# Patient Record
Sex: FEMALE | Race: BLACK OR AFRICAN AMERICAN | Employment: FULL TIME | URBAN - METROPOLITAN AREA
[De-identification: names, ages, dates, MRNs, and addresses within clinical notes are randomized per-mention and may not be internally consistent; named-entity substitution may affect disease eponyms.]

---

## 2023-10-14 ENCOUNTER — APPOINTMENT (OUTPATIENT)
Dept: GENERAL RADIOLOGY | Age: 58
End: 2023-10-14
Payer: COMMERCIAL

## 2023-10-14 ENCOUNTER — HOSPITAL ENCOUNTER (EMERGENCY)
Age: 58
Discharge: HOME | End: 2023-10-14
Payer: COMMERCIAL

## 2023-10-14 VITALS
DIASTOLIC BLOOD PRESSURE: 73 MMHG | HEIGHT: 65 IN | SYSTOLIC BLOOD PRESSURE: 128 MMHG | OXYGEN SATURATION: 99 % | HEART RATE: 86 BPM | BODY MASS INDEX: 41.65 KG/M2 | RESPIRATION RATE: 18 BRPM | WEIGHT: 250 LBS | TEMPERATURE: 98.2 F

## 2023-10-14 DIAGNOSIS — M17.12 OSTEOARTHRITIS OF LEFT KNEE, UNSPECIFIED OSTEOARTHRITIS TYPE: Primary | ICD-10-CM

## 2023-10-14 PROCEDURE — 6360000002 HC RX W HCPCS: Performed by: PHYSICIAN ASSISTANT

## 2023-10-14 PROCEDURE — 96372 THER/PROPH/DIAG INJ SC/IM: CPT

## 2023-10-14 PROCEDURE — 73562 X-RAY EXAM OF KNEE 3: CPT

## 2023-10-14 PROCEDURE — 99284 EMERGENCY DEPT VISIT MOD MDM: CPT

## 2023-10-14 RX ORDER — DEXAMETHASONE SODIUM PHOSPHATE 10 MG/ML
10 INJECTION INTRAMUSCULAR; INTRAVENOUS ONCE
Status: COMPLETED | OUTPATIENT
Start: 2023-10-14 | End: 2023-10-14

## 2023-10-14 RX ORDER — TRAMADOL HYDROCHLORIDE 50 MG/1
50 TABLET ORAL EVERY 8 HOURS PRN
Qty: 12 TABLET | Refills: 0 | Status: SHIPPED | OUTPATIENT
Start: 2023-10-14 | End: 2023-10-18

## 2023-10-14 RX ADMIN — DEXAMETHASONE SODIUM PHOSPHATE 10 MG: 10 INJECTION INTRAMUSCULAR; INTRAVENOUS at 15:19

## 2023-10-14 ASSESSMENT — PAIN SCALES - GENERAL: PAINLEVEL_OUTOF10: 8

## 2023-10-14 ASSESSMENT — LIFESTYLE VARIABLES
HOW OFTEN DO YOU HAVE A DRINK CONTAINING ALCOHOL: NEVER
HOW MANY STANDARD DRINKS CONTAINING ALCOHOL DO YOU HAVE ON A TYPICAL DAY: PATIENT DOES NOT DRINK

## 2023-10-14 ASSESSMENT — PAIN - FUNCTIONAL ASSESSMENT: PAIN_FUNCTIONAL_ASSESSMENT: 0-10

## 2023-10-14 NOTE — ED PROVIDER NOTES
Emergency Department Provider Note       PCP: Melisa Bernal MD   Age: 62 y.o. Sex: female     DISPOSITION Decision To Discharge 10/14/2023 03:21:16 PM       ICD-10-CM    1. Osteoarthritis of left knee, unspecified osteoarthritis type  M17.12 traMADol (ULTRAM) 50 MG tablet          Medical Decision Making     Complexity of Problems Addressed:  1 acute illness    Data Reviewed and Analyzed:  I independently ordered and reviewed each unique test.  I reviewed external records: provider visit note from PCP. I reviewed external records: provider visit note from outside specialist.       I interpreted the X-rays left knee x-rays show narrowing of the medial joint line mild effusion. Discussion of management or test interpretation. 63-year-old female with acute on chronic left knee pain history of knee arthritis likely further exacerbation. Stressed to patient need to see her orthopedist we will give Decadron 10 mg IM patient to continue at home Mobic we will give few tramadol's for severe pain. Work note given. Call orthopedic office on Monday to arrange follow-up appointment      Risk of Complications and/or Morbidity of Patient Management:  Prescription drug management performed. History       Patient to ER complaining of slowly worsening left knee pain over the past several months. Patient has a history of arthritis to the knees. She denies any recent trauma she did go to [place] last week and had some increased activity. She has had injections to the right knee in the past but never the left. Patient denies any fever no chest pain or shortness of breath no ankle edema. She has been using occasional Mobic for her knee arthritis with minimal relief she has not seen her orthopedist in about 2 years    Past medical history positive for type 2 diabetes    No past surgical history on file. Review of Systems   All other systems reviewed and are negative.       Physical Exam

## 2023-10-14 NOTE — DISCHARGE INSTRUCTIONS
Use at home Mobic daily for arthritic pain use tramadol only for severe pain call your orthopedic office on Monday to arrange follow-up appointment

## 2023-10-14 NOTE — ED TRIAGE NOTES
Pt arrives for complaints of left knee pain that is chronic but is reported to be worse today. Denies new trauma to the area. No swelling reported distally.